# Patient Record
Sex: FEMALE | Race: WHITE
[De-identification: names, ages, dates, MRNs, and addresses within clinical notes are randomized per-mention and may not be internally consistent; named-entity substitution may affect disease eponyms.]

---

## 2020-11-04 ENCOUNTER — HOSPITAL ENCOUNTER (OUTPATIENT)
Dept: HOSPITAL 53 - M OPP | Age: 45
Discharge: HOME | End: 2020-11-04
Attending: INTERNAL MEDICINE
Payer: COMMERCIAL

## 2020-11-04 VITALS — DIASTOLIC BLOOD PRESSURE: 96 MMHG | SYSTOLIC BLOOD PRESSURE: 150 MMHG

## 2020-11-04 VITALS — WEIGHT: 163.4 LBS | BODY MASS INDEX: 27.9 KG/M2 | HEIGHT: 64 IN

## 2020-11-04 DIAGNOSIS — K22.8: ICD-10-CM

## 2020-11-04 DIAGNOSIS — K44.9: ICD-10-CM

## 2020-11-04 DIAGNOSIS — R13.10: ICD-10-CM

## 2020-11-04 DIAGNOSIS — Z88.1: ICD-10-CM

## 2020-11-04 DIAGNOSIS — Z79.899: ICD-10-CM

## 2020-11-04 DIAGNOSIS — R00.0: ICD-10-CM

## 2020-11-04 DIAGNOSIS — R10.30: ICD-10-CM

## 2020-11-04 DIAGNOSIS — K64.0: Primary | ICD-10-CM

## 2020-11-04 DIAGNOSIS — Z87.891: ICD-10-CM

## 2020-11-04 NOTE — ROOR
________________________________________________________________________________

Patient Name: Patti Mcclelland           Procedure Date: 11/4/2020 10:30 AM

MRN: [id number]                          Account Number: [id number]

YOB: 1975               Age: 45

Room: [facility]                            Gender: Female

Note Status: Finalized                 

________________________________________________________________________________

 

Procedure:           Upper GI endoscopy + Balloon Dilatation + Biopsies

Indications:         Dysphagia

Providers:           Haryr Fisher MD

Referring MD:        SOULEYMANE COMER MD

Requesting Provider: 

Medicines:           Monitored Anesthesia Care

Complications:       No immediate complications.

________________________________________________________________________________

Procedure:           Pre-Anesthesia Assessment:

                     - The heart rate, respiratory rate, oxygen saturations, 

                     blood pressure, adequacy of pulmonary ventilation, and 

                     response to care were monitored throughout the procedure.

                     The Endoscope was introduced through the mouth, and 

                     advanced to the second part of duodenum. The upper GI 

                     endoscopy was accomplished without difficulty. The 

                     patient tolerated the procedure well.

                                                                                

Findings:

     The Z-line was variable and was found 40 cm from the incisors. Multiple 

     biopsies were obtained with cold forceps for evaluation to rule out 

     Padgett's Esophagus randomly at the gastroesophageal junction.

     A TTS dilator was passed through the scope. Dilation with an 18-19-20 mm 

     balloon dilator was performed to 20 mm in the entire esophagus.

     A small hiatal hernia was present.

     No other significant abnormalities were identified in a careful 

     examination of the stomach.

     The exam of the duodenum was otherwise normal.

                                                                                

Impression:          - Z-line variable, 40 cm from the incisors.

                     - Small hiatal hernia.

                     - Multiple biopsies were obtained at the gastroesophageal 

                     junction.

                     - Dilation performed in the entire esophagus.

                     - The examination was otherwise normal.

Recommendation:      - Patient has a contact number available for emergencies. 

                     The signs and symptoms of potential delayed complications 

                     were discussed with the patient. Return to normal 

                     activities tomorrow. Written discharge instructions were 

                     provided to the patient.

                     - Discharge patient to home.

                     - Follow an antireflux regimen.

                     - Continue present medications.

                     - Await pathology results.

                     - Telephone GI clinic for pathology results in 1 week.

                     - Return to referring physician.

                     - The findings and recommendations were discussed with 

                     the patient.

                                                                                

 

Harry Fisher MD

____________________

Harry Fisher MD

11/4/2020 10:53:13 AM

Electronically signed by Harry Fisher MD

Number of Addenda: 0

 

Note Initiated On: 11/4/2020 10:30 AM

Estimated Blood Loss:

     Estimated blood loss: none.

## 2020-11-04 NOTE — ROOR
________________________________________________________________________________

Patient Name: Patti Mcclelland           Procedure Date: 11/4/2020 10:31 AM

MRN: [id number]                          Account Number: [id number]

YOB: 1975               Age: 45

Room: [facility]                            Gender: Female

Note Status: Finalized                 

________________________________________________________________________________

 

Procedure:           Total Colonoscopy to Cecum + Bx. To r/o Microscopic 

                     Colitis

Indications:         Lower abdominal pain, Change in bowel habits

Providers:           Harry Fisher MD

Referring MD:        SOULEYMANE COMER MD

Requesting Provider: 

Medicines:           Monitored Anesthesia Care

Complications:       No immediate complications.

________________________________________________________________________________

Procedure:           Pre-Anesthesia Assessment:

                     - The heart rate, respiratory rate, oxygen saturations, 

                     blood pressure, adequacy of pulmonary ventilation, and 

                     response to care were monitored throughout the procedure.

                     The Colonoscope was introduced through the anus and 

                     advanced to the cecum, identified by appendiceal orifice 

                     and ileocecal valve. The colonoscopy was performed 

                     without difficulty. The patient tolerated the procedure 

                     well. The quality of the bowel preparation was good.

                                                                                

Findings:

     The perianal and digital rectal examinations were normal.

     Non-bleeding internal hemorrhoids were found during retroflexion. The 

     hemorrhoids were small and Grade I (internal hemorrhoids that do not 

     prolapse).

     The exam was otherwise without abnormality on direct and retroflexion 

     views.

     Biopsies for histology were taken with a cold forceps from the ascending 

     colon, transverse colon, descending colon and rectosigmoid colon for 

     evaluation of microscopic colitis.

     The exam was otherwise without abnormality.

                                                                                

Impression:          - Non-bleeding internal hemorrhoids.

                     - The examination was otherwise normal on direct and 

                     retroflexion views.

                     - The examination was otherwise normal.

                     - Biopsies were taken with a cold forceps from the 

                     ascending colon, transverse colon, descending colon and 

                     rectosigmoid colon for evaluation of microscopic colitis.

                     - The exam was otherwise normal to the cecum.

Recommendation:      - Patient has a contact number available for emergencies. 

                     The signs and symptoms of potential delayed complications 

                     were discussed with the patient. Return to normal 

                     activities tomorrow. Written discharge instructions were 

                     provided to the patient.

                     - High fiber diet.

                     - Discharge patient to home.

                     - Continue present medications.

                     - Await pathology results.

                     - Telephone GI clinic for pathology results in 1 week.

                     - Return to referring physician.

                     - The findings and recommendations were discussed with 

                     the patient.

                                                                                

 

Harry Fisher MD

____________________

Harry Fisher MD

11/4/2020 11:11:23 AM

Electronically signed by Harry Fisher MD

Number of Addenda: 0

 

Note Initiated On: 11/4/2020 10:31 AM

Estimated Blood Loss:

     Estimated blood loss: none.

## 2022-08-03 ENCOUNTER — HOSPITAL ENCOUNTER (EMERGENCY)
Age: 47
Discharge: HOME | End: 2022-08-03
Attending: EMERGENCY MEDICINE
Payer: OTHER GOVERNMENT

## 2022-08-03 ENCOUNTER — APPOINTMENT (OUTPATIENT)
Dept: GENERAL RADIOLOGY | Age: 47
End: 2022-08-03
Attending: EMERGENCY MEDICINE
Payer: OTHER GOVERNMENT

## 2022-08-03 VITALS
BODY MASS INDEX: 27.31 KG/M2 | DIASTOLIC BLOOD PRESSURE: 91 MMHG | HEART RATE: 88 BPM | SYSTOLIC BLOOD PRESSURE: 141 MMHG | OXYGEN SATURATION: 100 % | WEIGHT: 160 LBS | HEIGHT: 64 IN | RESPIRATION RATE: 18 BRPM | TEMPERATURE: 98.3 F

## 2022-08-03 DIAGNOSIS — S92.314A NONDISPLACED FRACTURE OF FIRST METATARSAL BONE, RIGHT FOOT, INITIAL ENCOUNTER FOR CLOSED FRACTURE: Primary | ICD-10-CM

## 2022-08-03 DIAGNOSIS — S93.601A SPRAIN OF RIGHT FOOT, INITIAL ENCOUNTER: ICD-10-CM
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
## 2022-08-03 PROCEDURE — 73610 X-RAY EXAM OF ANKLE: CPT

## 2022-08-03 PROCEDURE — 74011250637 HC RX REV CODE- 250/637: Performed by: PHYSICIAN ASSISTANT

## 2022-08-03 PROCEDURE — 99283 EMERGENCY DEPT VISIT LOW MDM: CPT

## 2022-08-03 PROCEDURE — 73660 X-RAY EXAM OF TOE(S): CPT

## 2022-08-03 RX ORDER — HYDROCODONE BITARTRATE AND ACETAMINOPHEN 5; 325 MG/1; MG/1
1 TABLET ORAL
Qty: 15 TABLET | Refills: 0 | Status: SHIPPED | OUTPATIENT
Start: 2022-08-03 | End: 2022-08-08

## 2022-08-03 RX ORDER — HYDROCODONE BITARTRATE AND ACETAMINOPHEN 5; 325 MG/1; MG/1
1 TABLET ORAL
Status: COMPLETED | OUTPATIENT
Start: 2022-08-03 | End: 2022-08-03

## 2022-08-03 RX ORDER — IBUPROFEN 600 MG/1
600 TABLET ORAL
Qty: 20 TABLET | Refills: 0 | Status: SHIPPED | OUTPATIENT
Start: 2022-08-03

## 2022-08-03 RX ADMIN — HYDROCODONE BITARTRATE AND ACETAMINOPHEN 1 TABLET: 5; 325 TABLET ORAL at 19:41

## 2022-08-03 NOTE — ED PROVIDER NOTES
EMERGENCY DEPARTMENT HISTORY AND PHYSICAL EXAM      Date: 8/3/2022  Patient Name: Rafael Laureano    History of Presenting Illness     Chief Complaint   Patient presents with    Fall     Reports mechanical fall at home around an hour ago and since has had 10/10 pain in right ankle and left big toe. Moderate swelling noted on right ankle. No open skin seen. Pt able to move all 10 toes. History Provided By: Patient    HPI: Rafael Laureano, 50 y.o. female with history of fibromyalgia and others as below presents with her son-in-law to the ED with cc of several hours of 10 out of 10 constant, achy left foot pain worse at the great toe and right foot pain near the ankle that is much worse with weightbearing. She tells me she was coming down the stairs earlier and her right ankle \"must have gave way\" because it twisted under her, causing her to fall. She tells me she has a gait at the bottom of the stairs and that is where her left foot got caught. She fell to the floor, but denies any other injuries. She tells me it took some time, but eventually she was able to get up and \"hobble\" weight. She tells me several weeks ago she did have a right \"elbow\" fracture and that is doing much better. Review of medical records demonstrates she had a nondisplaced radial head fracture of the right elbow. The fall today was due to a stumble and not due to any dizziness or weakness. She has been well lately without fever. She denies any throat pain or eye pain. She denies any chest pain or shortness of breath. She denies abdominal pain or flank pain. There has been no skin rash and she denies headache. There are no other complaints, changes, or physical findings at this time. PCP: None    Current Outpatient Medications   Medication Sig Dispense Refill    HYDROcodone-acetaminophen (NORCO) 5-325 mg per tablet Take 1 Tablet by mouth every six (6) hours as needed for Pain for up to 5 days. Max Daily Amount: 4 Tablets. 15 Tablet 0    ibuprofen (MOTRIN) 600 mg tablet Take 1 Tablet by mouth every eight (8) hours as needed for Pain. 20 Tablet 0    Cetirizine (ZYRTEC) 10 mg cap Take 1 Tab by mouth daily. melatonin 1 mg tablet Take 3 mg by mouth nightly. vitamin c-vitamin e (CRANBERRY CONCENTRATE) cap Take 1 Tab by mouth daily. Past History     Past Medical History:  Past Medical History:   Diagnosis Date    Endometriosis     Fibromyalgia     Hypothyroid     Other ill-defined conditions     osteoporosis    Other ill-defined conditions     fall, broke back    Other ill-defined conditions     fibromylagia       Past Surgical History:  Past Surgical History:   Procedure Laterality Date    HX GYN      hysterectomy       Family History:  No family history on file. Social History:  Social History     Tobacco Use    Smoking status: Never   Substance Use Topics    Alcohol use: Yes     Comment: occasionally    Drug use: No       Allergies: Allergies   Allergen Reactions    Erythromycin Swelling     Review of Systems   Review of Systems   Constitutional:  Negative for fever. HENT:  Negative for sore throat. Eyes:  Negative for pain. Respiratory:  Negative for shortness of breath. Cardiovascular:  Negative for chest pain. Gastrointestinal:  Negative for abdominal pain. Genitourinary:  Negative for flank pain. Musculoskeletal:         Left foot pain, worse at the great toe  Right foot pain near the ankle   Skin:  Negative for rash. Neurological:  Negative for dizziness, weakness and headaches. Physical Exam   Physical Exam  Vitals and nursing note reviewed. Constitutional:       General: She is not in acute distress. Appearance: She is well-developed. HENT:      Head: Normocephalic and atraumatic. Jaw: No trismus. Right Ear: External ear normal.      Left Ear: External ear normal.      Nose: Nose normal.      Mouth/Throat:      Pharynx: Uvula midline.    Eyes:      Conjunctiva/sclera: Conjunctivae normal.      Pupils: Pupils are equal, round, and reactive to light. Cardiovascular:      Rate and Rhythm: Normal rate and regular rhythm. Pulmonary:      Effort: Pulmonary effort is normal. No tachypnea, accessory muscle usage or respiratory distress. Breath sounds: No decreased breath sounds. Abdominal:      Palpations: Abdomen is soft. Tenderness: There is no abdominal tenderness. Musculoskeletal:         General: Normal range of motion. Cervical back: Full passive range of motion without pain and normal range of motion. Feet:       Comments:   RIGHT FOOT:  Tender contusion overlying the lateral aspect of the foot. There is no break in the skin. LEFT FOOT:  Tender contusion overlying the first metatarsal.  There is no break in the skin    She is able to fully flex hips knees to 90 degrees without pain. There is no tenderness to palpation of either knee or hip. Skin:     Findings: No rash. Neurological:      Mental Status: She is alert and oriented to person, place, and time. She is not disoriented. GCS: GCS eye subscore is 4. GCS verbal subscore is 5. GCS motor subscore is 6. Cranial Nerves: No cranial nerve deficit. Psychiatric:         Speech: Speech normal.     Diagnostic Study Results     Labs -   No results found for this or any previous visit (from the past 12 hour(s)). Radiologic Studies -   XR ANKLE RT MIN 3 V   Final Result   No acute abnormality. XR GREAT TOE LT MIN 2 V   Final Result   First metatarsal fracture. CT Results  (Last 48 hours)      None          CXR Results  (Last 48 hours)      None          Medical Decision Making   I am the first provider for this patient. I reviewed the vital signs, available nursing notes, past medical history, past surgical history, family history and social history. Vital Signs-Reviewed the patient's vital signs.   Patient Vitals for the past 12 hrs:   Temp Pulse Resp BP SpO2 08/03/22 2115 98.3 °F (36.8 °C) 88 18 (!) 141/91 100 %   08/03/22 1939 98.5 °F (36.9 °C) 80 18 (!) 156/89 100 %   08/03/22 1830 98.4 °F (36.9 °C) (!) 110 18 (!) 145/92 100 %       Pulse Oximetry Analysis - 100% on RA    Records Reviewed: Nursing Notes, Old Medical Records, Previous Radiology Studies, and Previous Laboratory Studies    Provider Notes (Medical Decision Making):   DDx: Fracture, sprain, strain, contusion    Plain films demonstrate a nondisplaced fracture of the left first metatarsal.  This is a closed injury and she is neurovascularly intact. Plain films of the right foot are negative for acute process. Will place in a cam walking boot. Pain medication is sent to the pharmacy. Refer to orthopedics. ED Course:   Initial assessment performed. The patients presenting problems have been discussed, and they are in agreement with the care plan formulated and outlined with them. I have encouraged them to ask questions as they arise throughout their visit. Disposition:  Discharge    PLAN:  1. Discharge Medication List as of 8/3/2022  8:52 PM        START taking these medications    Details   HYDROcodone-acetaminophen (NORCO) 5-325 mg per tablet Take 1 Tablet by mouth every six (6) hours as needed for Pain for up to 5 days. Max Daily Amount: 4 Tablets., Normal, Disp-15 Tablet, R-0      ibuprofen (MOTRIN) 600 mg tablet Take 1 Tablet by mouth every eight (8) hours as needed for Pain., Normal, Disp-20 Tablet, R-0           CONTINUE these medications which have NOT CHANGED    Details   Cetirizine (ZYRTEC) 10 mg cap Take 1 Tab by mouth daily. , Historical Med      melatonin 1 mg tablet Take 3 mg by mouth nightly., Historical Med      vitamin c-vitamin e (CRANBERRY CONCENTRATE) cap Take 1 Tab by mouth daily. , Historical Med           2.    Follow-up Information       Follow up With Specialties Details Why Contact Info    Kristin Rojas MD Orthopedic Surgery Call  ORTHO: call tomorrow to schedule follow up [place]  [address]  [phone]            Return to ED if worse     Diagnosis     Clinical Impression:   1. Nondisplaced fracture of first metatarsal bone, right foot, initial encounter for closed fracture    2. Sprain of right foot, initial encounter    3.  Fall down stairs, initial encounter